# Patient Record
Sex: FEMALE | Race: WHITE | NOT HISPANIC OR LATINO | Employment: FULL TIME | URBAN - NONMETROPOLITAN AREA
[De-identification: names, ages, dates, MRNs, and addresses within clinical notes are randomized per-mention and may not be internally consistent; named-entity substitution may affect disease eponyms.]

---

## 2022-05-11 ENCOUNTER — HOSPITAL ENCOUNTER (EMERGENCY)
Facility: HOSPITAL | Age: 62
Discharge: HOME | End: 2022-05-11
Attending: STUDENT IN AN ORGANIZED HEALTH CARE EDUCATION/TRAINING PROGRAM | Admitting: STUDENT IN AN ORGANIZED HEALTH CARE EDUCATION/TRAINING PROGRAM

## 2022-05-11 VITALS
RESPIRATION RATE: 16 BRPM | TEMPERATURE: 97.8 F | HEART RATE: 66 BPM | HEIGHT: 62 IN | DIASTOLIC BLOOD PRESSURE: 94 MMHG | WEIGHT: 115 LBS | SYSTOLIC BLOOD PRESSURE: 159 MMHG | BODY MASS INDEX: 21.16 KG/M2 | OXYGEN SATURATION: 98 %

## 2022-05-11 DIAGNOSIS — M62.838 NECK MUSCLE SPASM: Primary | ICD-10-CM

## 2022-05-11 PROCEDURE — 99284 EMERGENCY DEPT VISIT MOD MDM: CPT

## 2022-05-11 PROCEDURE — 25010000002 ORPHENADRINE CITRATE PER 60 MG: Performed by: STUDENT IN AN ORGANIZED HEALTH CARE EDUCATION/TRAINING PROGRAM

## 2022-05-11 PROCEDURE — 25010000002 MORPHINE PER 10 MG: Performed by: STUDENT IN AN ORGANIZED HEALTH CARE EDUCATION/TRAINING PROGRAM

## 2022-05-11 PROCEDURE — 25010000002 HYDROMORPHONE 1 MG/ML SOLUTION: Performed by: STUDENT IN AN ORGANIZED HEALTH CARE EDUCATION/TRAINING PROGRAM

## 2022-05-11 PROCEDURE — 96372 THER/PROPH/DIAG INJ SC/IM: CPT

## 2022-05-11 RX ORDER — TIZANIDINE 4 MG/1
4 TABLET ORAL NIGHTLY PRN
Qty: 12 TABLET | Refills: 0 | OUTPATIENT
Start: 2022-05-11 | End: 2022-06-23

## 2022-05-11 RX ORDER — NABUMETONE 750 MG/1
750 TABLET, FILM COATED ORAL 2 TIMES DAILY PRN
Qty: 6 TABLET | Refills: 0 | OUTPATIENT
Start: 2022-05-11 | End: 2022-06-23

## 2022-05-11 RX ORDER — ORPHENADRINE CITRATE 30 MG/ML
60 INJECTION INTRAMUSCULAR; INTRAVENOUS ONCE
Status: COMPLETED | OUTPATIENT
Start: 2022-05-11 | End: 2022-05-11

## 2022-05-11 RX ADMIN — ORPHENADRINE CITRATE 60 MG: 30 INJECTION INTRAMUSCULAR; INTRAVENOUS at 04:47

## 2022-05-11 RX ADMIN — HYDROMORPHONE HYDROCHLORIDE 1 MG: 1 INJECTION, SOLUTION INTRAMUSCULAR; INTRAVENOUS; SUBCUTANEOUS at 06:32

## 2022-05-11 RX ADMIN — MORPHINE SULFATE 4 MG: 4 INJECTION INTRAVENOUS at 04:47

## 2022-05-11 NOTE — ED PROVIDER NOTES
Subjective   61-year-old female comes to the ER chief complaint of a 1 day history of neck muscle tightness.  She woke up yesterday with the pain.  She has tried taking muscle relaxants and pain medicine, but they have not helped.  No fevers or chills.  The pain has gotten worse throughout the day restricting her movement of the neck.  No injury or trauma that she can recall.      History provided by:  Patient   used: No        Review of Systems   Constitutional: Negative for chills and fever.   HENT: Negative for drooling.    Eyes: Negative for redness.   Respiratory: Negative for shortness of breath.    Cardiovascular: Negative for chest pain.   Gastrointestinal: Negative for abdominal pain, nausea and vomiting.   Genitourinary: Negative for flank pain.   Musculoskeletal: Positive for myalgias, neck pain and neck stiffness. Negative for back pain.   Skin: Negative for color change.   Neurological: Negative for dizziness, seizures, weakness, light-headedness, numbness and headaches.   Psychiatric/Behavioral: Negative for confusion.       History reviewed. No pertinent past medical history.    Allergies   Allergen Reactions   • Reglan [Metoclopramide] Angioedema       Past Surgical History:   Procedure Laterality Date   •  SECTION         History reviewed. No pertinent family history.    Social History     Socioeconomic History   • Marital status:    Tobacco Use   • Smoking status: Current Every Day Smoker     Packs/day: 0.50     Types: Cigarettes   Vaping Use   • Vaping Use: Never used   Substance and Sexual Activity   • Alcohol use: Never   • Drug use: Never           Objective    Vitals:    22 0419 22 0447 22 0530 22 0557   BP: 161/83 163/94 177/92 177/86   BP Location: Left arm Left arm Left arm Left arm   Patient Position: Lying Sitting Sitting Lying   Pulse: 71 73 74 67   Resp: 18 18 18 18   Temp: 97.8 °F (36.6 °C)      TempSrc: Oral      SpO2: 98%  "97% 98% 97%   Weight: 52.2 kg (115 lb)      Height: 157.5 cm (62\")          Physical Exam  Vitals and nursing note reviewed.   Constitutional:       General: She is not in acute distress.     Appearance: She is well-developed. She is not diaphoretic.   HENT:      Head: Normocephalic.      Right Ear: External ear normal.      Left Ear: External ear normal.   Eyes:      Conjunctiva/sclera: Conjunctivae normal.   Neck:      Meningeal: Brudzinski's sign and Kernig's sign absent.     Pulmonary:      Effort: Pulmonary effort is normal. No accessory muscle usage or respiratory distress.   Musculoskeletal:      Cervical back: Torticollis present. No erythema, signs of trauma or rigidity. Pain with movement and muscular tenderness present. No spinous process tenderness. Decreased range of motion.   Skin:     General: Skin is warm and dry.   Psychiatric:         Behavior: Behavior normal.         Procedures           ED Course                                               MDM  Number of Diagnoses or Management Options  Neck muscle spasm: new and requires workup  Diagnosis management comments: Vital signs are stable, afebrile.  Neurologically intact.  Patient received Norflex, morphine, Dilaudid for the pain.  No history of injury or trauma no believe imaging would be beneficial at this time.  Will call in an anti-inflammatory and muscle relaxants.  Recommend follow-up with the patient's PCP.  Return precautions provided.  Patient states understanding and is agreeable to the plan.      Final diagnoses:   Neck muscle spasm       ED Disposition  ED Disposition     ED Disposition   Discharge    Condition   Stable    Comment   --             [facility]  [address] [place] [id number] [phone]  Schedule an appointment as soon as possible for a visit in 2 days  ER follow up         Medication List      New Prescriptions    nabumetone 750 MG tablet  Commonly known " as: RELAFEN  Take 1 tablet by mouth 2 (Two) Times a Day As Needed for Mild Pain .     tiZANidine 4 MG tablet  Commonly known as: Zanaflex  Take 1 tablet by mouth At Night As Needed for Muscle Spasms.           Where to Get Your Medications      These medications were sent to [id number] - [place], [place] - [facility] [facility] AT [facility] [address] - [phone]  - [phone] [address] [address]    Phone: [phone]   · nabumetone 750 MG tablet  · tiZANidine 4 MG tablet          Miguel Cardoza MD  05/11/22 [id number]